# Patient Record
Sex: FEMALE | Race: WHITE | NOT HISPANIC OR LATINO | URBAN - METROPOLITAN AREA
[De-identification: names, ages, dates, MRNs, and addresses within clinical notes are randomized per-mention and may not be internally consistent; named-entity substitution may affect disease eponyms.]

---

## 2022-06-10 ENCOUNTER — WEB ENCOUNTER (OUTPATIENT)
Dept: URBAN - METROPOLITAN AREA CLINIC 118 | Facility: CLINIC | Age: 27
End: 2022-06-10

## 2022-06-15 ENCOUNTER — OFFICE VISIT (OUTPATIENT)
Dept: URBAN - METROPOLITAN AREA CLINIC 118 | Facility: CLINIC | Age: 27
End: 2022-06-15
Payer: COMMERCIAL

## 2022-06-15 ENCOUNTER — DASHBOARD ENCOUNTERS (OUTPATIENT)
Age: 27
End: 2022-06-15

## 2022-06-15 VITALS
DIASTOLIC BLOOD PRESSURE: 41 MMHG | HEART RATE: 83 BPM | HEIGHT: 62 IN | SYSTOLIC BLOOD PRESSURE: 125 MMHG | BODY MASS INDEX: 39.2 KG/M2 | WEIGHT: 213 LBS | TEMPERATURE: 97.9 F

## 2022-06-15 DIAGNOSIS — R14.0 ABDOMINAL BLOATING: ICD-10-CM

## 2022-06-15 DIAGNOSIS — R19.7 DIARRHEA OF PRESUMED INFECTIOUS ORIGIN: ICD-10-CM

## 2022-06-15 DIAGNOSIS — R63.5 ABNORMAL WEIGHT GAIN: ICD-10-CM

## 2022-06-15 DIAGNOSIS — R19.8 IRREGULAR BOWEL HABITS: ICD-10-CM

## 2022-06-15 PROCEDURE — 99204 OFFICE O/P NEW MOD 45 MIN: CPT | Performed by: INTERNAL MEDICINE

## 2022-06-15 NOTE — PHYSICAL EXAM SKIN:
no rashes , no suspicious lesions , no areas of discoloration , no jaundice present , good turgor , no masses , no tenderness on palpation
Attending Only

## 2022-06-15 NOTE — HPI-TODAY'S VISIT:
The patient self-referred for abdominal bloating and irregular bowel habits.  Note was sent to her primary care.  She is a 26-year-old female who has had chronic stomach issues since her teens but never formally diagnosed with irritable bowel syndrome.  Her primary complaint has been an upset stomach with mild nausea, gas bloating, and irregular bowel habits for over 3 years with slowly worsening in the last 6 months.  She has had no formal diagnostic work-up including no food allergy testing.  However she does see her primary care about once a year and lab work including thyroid hormones have been normal.  She has gained 25 pounds in the last 2 years.  Her bowel movements alternate between constipation and diarrhea but are rarely regular.  She has a bowel movement at least every 2 to 3 days.  She eats a moderate fiber diet and thinks that increased fiber actually made her more bloated.  She has no vomiting, significant blood in her stools, jaundice, itching, chest pain, shortness of breath, or flares of abdominal pain with her menstrual cycles.  Her cycles are generally fairly regular.  She takes no over-the-counter medications nor prescriptions.  She has had no abdominal surgeries.  There is no family history of inflammatory bowel disease or gastric/colon cancer; her father had reflux and gallbladder disease.

## 2022-06-22 LAB
ALBUMIN: 4.4
ALKALINE PHOSPHATASE: 82
ALT (SGPT): 17
AST (SGOT): 19
BASO (ABSOLUTE): 0
BASOS: 1
BILIRUBIN, DIRECT: <0.1
BILIRUBIN, TOTAL: 0.3
C-REACTIVE PROTEIN, QUANT: 6
EOS (ABSOLUTE): 0.1
EOS: 2
F001-IGE EGG WHITE: 0.13
F002-IGE MILK: 0.44
F003-IGE CODFISH: <0.1
F004-IGE WHEAT: <0.1
F008-IGE CORN: <0.1
F010-IGE SESAME SEED: <0.1
F013-IGE PEANUT: 0.11
F014-IGE SOYBEAN: <0.1
F024-IGE SHRIMP: <0.1
F207-IGE CLAM: <0.1
F256-IGE WALNUT: <0.1
F338-IGE SCALLOP: <0.1
HEMATOCRIT: 38.6
HEMATOLOGY COMMENTS:: (no result)
HEMOGLOBIN: 12.7
IMMATURE CELLS: (no result)
IMMATURE GRANS (ABS): 0
IMMATURE GRANULOCYTES: 0
LYMPHS (ABSOLUTE): 2.6
LYMPHS: 33
Lab: (no result)
MCH: 30.3
MCHC: 32.9
MCV: 92
MONOCYTES(ABSOLUTE): 0.5
MONOCYTES: 7
NEUTROPHILS (ABSOLUTE): 4.6
NEUTROPHILS: 57
NRBC: (no result)
PLATELETS: 312
PROTEIN, TOTAL: 7.6
RBC: 4.19
RDW: 12.4
TSH: 0.69
TTG/DGP SCREEN: NEGATIVE
WBC: 8